# Patient Record
Sex: MALE | Race: WHITE | URBAN - METROPOLITAN AREA
[De-identification: names, ages, dates, MRNs, and addresses within clinical notes are randomized per-mention and may not be internally consistent; named-entity substitution may affect disease eponyms.]

---

## 2020-11-05 ENCOUNTER — OFFICE VISIT (OUTPATIENT)
Dept: URBAN - METROPOLITAN AREA CLINIC 71 | Facility: CLINIC | Age: 50
End: 2020-11-05
Payer: COMMERCIAL

## 2020-11-05 PROCEDURE — 92004 COMPRE OPH EXAM NEW PT 1/>: CPT | Performed by: OPTOMETRIST

## 2020-11-05 ASSESSMENT — INTRAOCULAR PRESSURE
OS: 11
OD: 10

## 2020-11-05 ASSESSMENT — VISUAL ACUITY
OD: 20/20
OS: 20/20

## 2020-11-05 NOTE — IMPRESSION/PLAN
Impression: Myopia, bilateral: H52.13. New glasses RX given today. Plan: Myopia or nearsightedness develops during school age and is a result of the eyeball being too long, or the cornea having too much curvature. Patients usually complain of not being able to see or read distant objects, such as chalkboard writing, TV screen or movies. Myopia often progresses until patients reach their late twenties or early thirties. Myopia can be managed with corrective eye wear, such as eyeglasses or contacts to correct vision. Astigmatism causes blurred vision due to either an irregularly shaped cornea or the curvature of the lens. Small amounts of astigmatism do not need to be treated, but larger amounts can cause visual distortion, blurred vision, eye strain and headaches.

## 2021-08-31 ENCOUNTER — OFFICE VISIT (OUTPATIENT)
Dept: URBAN - METROPOLITAN AREA CLINIC 72 | Facility: CLINIC | Age: 51
End: 2021-08-31
Payer: COMMERCIAL

## 2021-08-31 DIAGNOSIS — H52.13 MYOPIA, BILATERAL: Primary | ICD-10-CM

## 2021-08-31 PROCEDURE — 92012 INTRM OPH EXAM EST PATIENT: CPT | Performed by: OPTOMETRIST

## 2021-08-31 PROCEDURE — 92310 CONTACT LENS FITTING OU: CPT | Performed by: OPTOMETRIST

## 2021-08-31 ASSESSMENT — INTRAOCULAR PRESSURE
OD: 11
OS: 12

## 2021-08-31 ASSESSMENT — VISUAL ACUITY
OS: 20/20
OD: 20/25

## 2021-08-31 NOTE — IMPRESSION/PLAN
Impression: Myopia, bilateral: H52.13. Plan: New spec Rx given - Discussed changes and possible adaptation period. CL RX order today as well Pt may order if satisfied with lenses

## 2021-10-07 ENCOUNTER — OFFICE VISIT (OUTPATIENT)
Dept: URBAN - METROPOLITAN AREA CLINIC 72 | Facility: CLINIC | Age: 51
End: 2021-10-07

## 2021-10-07 PROCEDURE — V2799 MISC VISION ITEM OR SERVICE: HCPCS | Performed by: OPTOMETRIST

## 2021-10-07 NOTE — IMPRESSION/PLAN
Impression: Myopia, bilateral: H52.13.  Plan: Update to CL RX 
changed axis OD to 90 and changed sphere OS -0.25 to a -1.00